# Patient Record
(demographics unavailable — no encounter records)

---

## 2024-12-05 NOTE — IMAGING
[Facet arthropathy] : Facet arthropathy [Disc space narrowing] : Disc space narrowing [de-identified] : LSPINE skin intact  Palpation: No tenderness to palpation or spasm in bilateral thoracic and lumbar paraspinal musculature, no SI joint tenderness to palpation ROM: limited all planes Strength: 5/5 bilateral hip flexors, knee extensors, ankle dorsiflexors, EHL, ankle plantarflexors Sensation: Sensation present to light touch bilateral L2-S1 distributions Provocative maneuvers: Negative R straight leg raise; + L SLR  Bilateral hips- Palpation: No tenderness to palpation over greater trochanter or IT band ROM: No pain with flexion and internal rotation [FreeTextEntry1] : Crest at L4/5 with alicia-sacralized Right L5 and somewhat diminutive L5/S1 disc

## 2024-12-05 NOTE — HISTORY OF PRESENT ILLNESS
[10] : 10 [Throbbing] : throbbing [Constant] : constant [Household chores] : household chores [Sleep] : sleep [Nothing helps with pain getting better] : Nothing helps with pain getting better [Sitting] : sitting [Walking] : walking [Bending forward] : bending forward [Stairs] : stairs [de-identified] : 24 [facility] Lumbar MRI  - report noted in chart.  Sacralization of the L5 vertebral body.  Vertebral body height, marrow signal homogeneity, and facet alignment are maintained throughout the visualized spinal segments. Mild exaggeration of the lumbar lordosis. Mild disc space narrowing at L5-S1. Conus terminates at L1 and is normal in size and morphology. No abnormal intrinsic cord signal. T11-T12: No spinal canal stenosis or neural foraminal narrowing.  T12-L1: No spinal canal stenosis or neural foraminal narrowing.  L1-L2: No spinal canal stenosis or neural foraminal narrowing..  L2-L3: Marked bilateral facet hypertrophy results in mild thecal sac compression. No neural foraminal narrowing.  L3-L4: Marked bilateral facet hypertrophy results in mild to moderate thecal sac compression and exerts mass effect upon the descending left L4 nerve root in the left lateral recess. Mild right and mild to moderate left neural foraminal narrowing.  L4-L5: Disc bulge and marked bilateral facet hypertrophy results in moderate to severe thecal sac compression. Mild bilateral neural foraminal narrowing.  L5-S1: Bilateral facet hypertrophy. No spinal canal stenosis. Mild left neural foraminal narrowing.  Ind. review- Agree Left lateral recess stenosis L3/4,  Severe central stenosis and sub-articular stenosis L4/5  ======================================================================== PMH: HTN, HLD, DM2 6.7  PSH: stent x2, L TKA, CCY, , pituitary adenoma SH: no tobacco, rare etoh Occ: former home care  2024: 59 year-old female patient presents for lower back pain. pt states that she has been dealing with lower back pain for  over a decade but the pain intermittent. she states that for the last 6 weeks she been dealing with extreme pain that has sent her to the ER. Radiates to the L glutes. Tries OTCs, no relief. Has tried gabapentin in the past. MDPs in the past without relief. Has been through formal PT.  2024: patient returns for follow up on lower back. Persisting back pain into L buttocks. Would like to hold off sx and discuss other conservative measures.  10/03/2024: Patient is here to follow up on lower back pain, reports small improvement with lidocaine patch as well as diclofenac gel.  Has not done physical therapy due to not working in past.   2024: patient is here to follow up on lower back pain. she reports worsening symptoms x 1 week. notes that she has been using lido patches and diclofenac gel with some relief. Today 9/10 pain level.  failed conservative measures of Nsiads/pt/muscle relaxers.  [] : no [FreeTextEntry1] : lower back

## 2024-12-05 NOTE — ASSESSMENT
[FreeTextEntry1] : Agree Left lateral recess stenosis L3/4,  Severe central stenosis and sub-articular stenosis L4/5  XR shows alicia-sacralized Right L5, stable segment   Will send for updated MRI for surgical planning, prior MRI axial imaging is not appropriately angled with respect to sagittals  Has failed extensive conservative measures including PT, medications, not interested in LESI, is interested in more definitive interventions. Surgery discussed with patient once again.  Indicating for laminectomy and fusion L3-L5 with TLIF L4/5 Laminectomy and Fusion- We've discussed the surgery details including instrumentation and grafting options (local, allograft, ICBG, and biologics) as well as potential for complications including but not limited to pain, scar, bleeding, and infection. There is also a possibility for hardware complication such as malposition of hardware, hardware loosening, pullout, failure or fracture of bone, adjacent segment disease, pseudarthrosis, and need for future surgery. Finally, we discussed potential for injury to nerves, the spinal cord either transient or permanent, damage to blood vessels, CSF leak, blindness, need for transfusion, and medical complications. The patient verbalized understanding and all questions were answered.  Patient has decided to move forward with surgery at this time.  Discussed resume PT, does not want to continue due to poor outcome, role of injection therapy (does not want it now), and medication management. Patient would like to continue topical agents   NSAIDs- Patient warned of risk of medication to GI tract, increased blood pressure, cardiac risk, and risk of fluid retention.  Advised to clear medication with internist or PCP if any concurrent health problem with heart, blood pressure, or GI system exists, regardless of gel use.

## 2025-01-16 NOTE — HISTORY OF PRESENT ILLNESS
[10] : 10 [Throbbing] : throbbing [Constant] : constant [Household chores] : household chores [Sleep] : sleep [Nothing helps with pain getting better] : Nothing helps with pain getting better [Sitting] : sitting [Walking] : walking [Bending forward] : bending forward [Stairs] : stairs [de-identified] : 24 [facility] Lumbar MRI  - report noted in chart.  Sacralization of the L5 vertebral body.  Vertebral body height, marrow signal homogeneity, and facet alignment are maintained throughout the visualized spinal segments. Mild exaggeration of the lumbar lordosis. Mild disc space narrowing at L5-S1. Conus terminates at L1 and is normal in size and morphology. No abnormal intrinsic cord signal. T11-T12: No spinal canal stenosis or neural foraminal narrowing. T12-L1: No spinal canal stenosis or neural foraminal narrowing. L1-L2: No spinal canal stenosis or neural foraminal narrowing.. L2-L3: Marked bilateral facet hypertrophy results in mild thecal sac compression. No neural foraminal narrowing. L3-L4: Marked bilateral facet hypertrophy results in mild to moderate thecal sac compression and exerts mass effect upon the descending left L4 nerve root in the left lateral recess. Mild right and mild to moderate left neural foraminal narrowing. L4-L5: Disc bulge and marked bilateral facet hypertrophy results in moderate to severe thecal sac compression. Mild bilateral neural foraminal narrowing. L5-S1: Bilateral facet hypertrophy. No spinal canal stenosis. Mild left neural foraminal narrowing. Ind. review- Agree Left lateral recess stenosis L3/4,  Severe central stenosis and sub-articular stenosis L4/5   2024 Lumbar MRI  - report noted in chart.   Ind. 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 pt states that she has been dealing with lower back pain for  over a decade but the pain intermittent. she states that for the last 6 weeks she been dealing with extreme pain that has sent her to the ER. Radiates to the L glutes. Tries OTCs, no relief. Has tried gabapentin in the past. MDPs in the past without relief. Has been through formal PT in ~May-2024.  2024: patient returns for follow up on lower back. Persisting back pain into L buttocks. Would like to hold off sx and discuss other conservative measures.  10/03/2024: Patient is here to follow up on lower back pain, reports small improvement with lidocaine patch as well as diclofenac gel.  Has not done physical therapy due to not working in past.   2024: patient is here to follow up on lower back pain. she reports worsening symptoms x 1 week. notes that she has been using lido patches and diclofenac gel with some relief. Today 9/10 pain level.  failed conservative measures of Nsiads/pt/muscle relaxers.   2025: patient is here to follow up on lower back pain. patient states she is in severe pain. difficult to sit or lay for long periods of time. Awaiting insurance approval for surgery-which was recently cancelled. Has been doing HEP w/o relief.    [] : no [FreeTextEntry1] : lower back

## 2025-01-16 NOTE — IMAGING
[Facet arthropathy] : Facet arthropathy [Disc space narrowing] : Disc space narrowing [de-identified] : LSPINE skin intact  Palpation: No tenderness to palpation or spasm in bilateral thoracic and lumbar paraspinal musculature, no SI joint tenderness to palpation ROM: limited all planes Strength: 5/5 bilateral hip flexors, knee extensors, ankle dorsiflexors, EHL, ankle plantarflexors Sensation: Sensation present to light touch bilateral L2-S1 distributions Provocative maneuvers: Negative R straight leg raise; + L SLR  Bilateral hips- Palpation: No tenderness to palpation over greater trochanter or IT band ROM: No pain with flexion and internal rotation  BMI: 28.3  Ht: 5'3 Wt: 160 [FreeTextEntry1] : Crest at L4/5 with alicia-sacralized Right L5 and somewhat diminutive L5/S1 disc

## 2025-01-16 NOTE — ASSESSMENT
[FreeTextEntry1] : Agree Left NF stenosis L3/4,  Severe central stenosis and sub-articular stenosis L4/5  XR shows alicia-sacralized Right L5, stable segment   Has failed extensive conservative measures including PT, continued HEP under my care for the last 7 months, medications, not interested in LESI, is interested in more definitive interventions. Surgery discussed with patient once again.  Indicating for laminectomy and fusion L3-L5 with TLIF L4/5. Will require >50% facet resection to adequately decompress at L3/4 and L4/5 TLIF for spondylolisthesis.  Laminectomy and Fusion- We've discussed the surgery details including instrumentation and grafting options (local, allograft, ICBG, and biologics) as well as potential for complications including but not limited to pain, scar, bleeding, and infection. There is also a possibility for hardware complication such as malposition of hardware, hardware loosening, pullout, failure or fracture of bone, adjacent segment disease, pseudarthrosis, and need for future surgery. Finally, we discussed potential for injury to nerves, the spinal cord either transient or permanent, damage to blood vessels, CSF leak, blindness, need for transfusion, and medical complications. The patient verbalized understanding and all questions were answered.  NSAIDs- Patient warned of risk of medication to GI tract, increased blood pressure, cardiac risk, and risk of fluid retention.  Advised to clear medication with internist or PCP if any concurrent health problem with heart, blood pressure, or GI system exists, regardless of gel use.

## 2025-02-27 NOTE — PHYSICAL EXAM
[de-identified] : LSPINE Inspection: incision clean, no signs of infection.  Palpation: spasm in bilateral thoracic and lumbar paraspinal musculature, no SI joint tenderness to palpation ROM: Limited in al planes Strength: 5/5 bilateral hip flexors, knee extensors, ankle dorsiflexors, EHL, ankle plantarflexors Sensation: Sensation present to light touch bilateral L2-S1 distributions Provocative maneuvers: Negative bilateral straight leg raise

## 2025-02-27 NOTE — HISTORY OF PRESENT ILLNESS
[10] : 10 [Throbbing] : throbbing [Constant] : constant [Household chores] : household chores [Sleep] : sleep [Nothing helps with pain getting better] : Nothing helps with pain getting better [Sitting] : sitting [Walking] : walking [Bending forward] : bending forward [Stairs] : stairs [de-identified] : 2/10/25 SURGERY L4/5 TLIF L sided, 3-5 LAMI, dural repair  24 [facility] Lumbar MRI  - report noted in chart.  Sacralization of the L5 vertebral body.  Vertebral body height, marrow signal homogeneity, and facet alignment are maintained throughout the visualized spinal segments. Mild exaggeration of the lumbar lordosis. Mild disc space narrowing at L5-S1. Conus terminates at L1 and is normal in size and morphology. No abnormal intrinsic cord signal. T11-T12: No spinal canal stenosis or neural foraminal narrowing. T12-L1: No spinal canal stenosis or neural foraminal narrowing. L1-L2: No spinal canal stenosis or neural foraminal narrowing.. L2-L3: Marked bilateral facet hypertrophy results in mild thecal sac compression. No neural foraminal narrowing. L3-L4: Marked bilateral facet hypertrophy results in mild to moderate thecal sac compression and exerts mass effect upon the descending left L4 nerve root in the left lateral recess. Mild right and mild to moderate left neural foraminal narrowing. L4-L5: Disc bulge and marked bilateral facet hypertrophy results in moderate to severe thecal sac compression. Mild bilateral neural foraminal narrowing. L5-S1: Bilateral facet hypertrophy. No spinal canal stenosis. Mild left neural foraminal narrowing. Ind. review- Agree Left lateral recess stenosis L3/4,  Severe central stenosis and sub-articular stenosis L4/5   2024 Lumbar MRI  - report noted in chart.   Ind. 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 pt states that she has been dealing with lower back pain for  over a decade but the pain intermittent. she states that for the last 6 weeks she been dealing with extreme pain that has sent her to the ER. Radiates to the L glutes. Tries OTCs, no relief. Has tried gabapentin in the past. MDPs in the past without relief. Has been through formal PT in ~May-2024.  2024: patient returns for follow up on lower back. Persisting back pain into L buttocks. Would like to hold off sx and discuss other conservative measures.  10/03/2024: Patient is here to follow up on lower back pain, reports small improvement with lidocaine patch as well as diclofenac gel.  Has not done physical therapy due to not working in past.   2024: patient is here to follow up on lower back pain. she reports worsening symptoms x 1 week. notes that she has been using lido patches and diclofenac gel with some relief. Today 9/10 pain level.  failed conservative measures of Nsiads/pt/muscle relaxers.   2025: patient is here to follow up on lower back pain. patient states she is in severe pain. difficult to sit or lay for long periods of time. Awaiting insurance approval for surgery-which was recently cancelled. Has been doing HEP w/o relief.   2025: patient is here for pov#1. Sore back. Pain down the LLE improved, still some N/T.    [] : no [FreeTextEntry1] : lower back

## 2025-02-27 NOTE — PHYSICAL EXAM
[de-identified] : LSPINE Inspection: incision clean, no signs of infection.  Palpation: spasm in bilateral thoracic and lumbar paraspinal musculature, no SI joint tenderness to palpation ROM: Limited in al planes Strength: 5/5 bilateral hip flexors, knee extensors, ankle dorsiflexors, EHL, ankle plantarflexors Sensation: Sensation present to light touch bilateral L2-S1 distributions Provocative maneuvers: Negative bilateral straight leg raise

## 2025-02-27 NOTE — ASSESSMENT
[FreeTextEntry1] : 2/10/25 SURGERY L4/5 TLIF L sided, 3-5 LAMI, dural repair  Discussed wound care  F/up in 1 month, will discuss PT.

## 2025-02-27 NOTE — HISTORY OF PRESENT ILLNESS
[10] : 10 [Throbbing] : throbbing [Constant] : constant [Household chores] : household chores [Sleep] : sleep [Nothing helps with pain getting better] : Nothing helps with pain getting better [Sitting] : sitting [Walking] : walking [Bending forward] : bending forward [Stairs] : stairs [de-identified] : 2/10/25 SURGERY L4/5 TLIF L sided, 3-5 LAMI, dural repair  24 [facility] Lumbar MRI  - report noted in chart.  Sacralization of the L5 vertebral body.  Vertebral body height, marrow signal homogeneity, and facet alignment are maintained throughout the visualized spinal segments. Mild exaggeration of the lumbar lordosis. Mild disc space narrowing at L5-S1. Conus terminates at L1 and is normal in size and morphology. No abnormal intrinsic cord signal. T11-T12: No spinal canal stenosis or neural foraminal narrowing. T12-L1: No spinal canal stenosis or neural foraminal narrowing. L1-L2: No spinal canal stenosis or neural foraminal narrowing.. L2-L3: Marked bilateral facet hypertrophy results in mild thecal sac compression. No neural foraminal narrowing. L3-L4: Marked bilateral facet hypertrophy results in mild to moderate thecal sac compression and exerts mass effect upon the descending left L4 nerve root in the left lateral recess. Mild right and mild to moderate left neural foraminal narrowing. L4-L5: Disc bulge and marked bilateral facet hypertrophy results in moderate to severe thecal sac compression. Mild bilateral neural foraminal narrowing. L5-S1: Bilateral facet hypertrophy. No spinal canal stenosis. Mild left neural foraminal narrowing. Ind. review- Agree Left lateral recess stenosis L3/4,  Severe central stenosis and sub-articular stenosis L4/5   2024 Lumbar MRI  - report noted in chart.   Ind. 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 pt states that she has been dealing with lower back pain for  over a decade but the pain intermittent. she states that for the last 6 weeks she been dealing with extreme pain that has sent her to the ER. Radiates to the L glutes. Tries OTCs, no relief. Has tried gabapentin in the past. MDPs in the past without relief. Has been through formal PT in ~May-2024.  2024: patient returns for follow up on lower back. Persisting back pain into L buttocks. Would like to hold off sx and discuss other conservative measures.  10/03/2024: Patient is here to follow up on lower back pain, reports small improvement with lidocaine patch as well as diclofenac gel.  Has not done physical therapy due to not working in past.   2024: patient is here to follow up on lower back pain. she reports worsening symptoms x 1 week. notes that she has been using lido patches and diclofenac gel with some relief. Today 9/10 pain level.  failed conservative measures of Nsiads/pt/muscle relaxers.   2025: patient is here to follow up on lower back pain. patient states she is in severe pain. difficult to sit or lay for long periods of time. Awaiting insurance approval for surgery-which was recently cancelled. Has been doing HEP w/o relief.   2025: patient is here for pov#1. Sore back. Pain down the LLE improved, still some N/T.    [] : no [FreeTextEntry1] : lower back

## 2025-03-03 NOTE — IMAGING
[Facet arthropathy] : Facet arthropathy [de-identified] : LSPINE skin intact no e/o infection  Palpation: No tenderness to palpation or spasm in bilateral thoracic and lumbar paraspinal musculature, no SI joint tenderness to palpation ROM: limited all planes Strength: 5/5 bilateral hip flexors, knee extensors, ankle dorsiflexors, EHL, ankle plantarflexors Sensation: Sensation present to light touch bilateral L2-S1 distributions Provocative maneuvers: Negative SLR   Bilateral hips- Palpation: No tenderness to palpation over greater trochanter or IT band ROM: No pain with flexion and internal rotation   [Disc space narrowing] : Disc space narrowing [Implants in position] : Implants in position [FreeTextEntry1] : Crest at L4/5 with alicia-sacralized Right L5 and somewhat diminutive L5/S1 disc

## 2025-03-03 NOTE — IMAGING
[Facet arthropathy] : Facet arthropathy [de-identified] : LSPINE skin intact no e/o infection  Palpation: No tenderness to palpation or spasm in bilateral thoracic and lumbar paraspinal musculature, no SI joint tenderness to palpation ROM: limited all planes Strength: 5/5 bilateral hip flexors, knee extensors, ankle dorsiflexors, EHL, ankle plantarflexors Sensation: Sensation present to light touch bilateral L2-S1 distributions Provocative maneuvers: Negative SLR   Bilateral hips- Palpation: No tenderness to palpation over greater trochanter or IT band ROM: No pain with flexion and internal rotation   [Disc space narrowing] : Disc space narrowing [Implants in position] : Implants in position [FreeTextEntry1] : Crest at L4/5 with alicia-sacralized Right L5 and somewhat diminutive L5/S1 disc

## 2025-03-27 NOTE — HISTORY OF PRESENT ILLNESS
[10] : 10 [Throbbing] : throbbing [Constant] : constant [Household chores] : household chores [Sleep] : sleep [Nothing helps with pain getting better] : Nothing helps with pain getting better [Sitting] : sitting [Walking] : walking [Bending forward] : bending forward [Stairs] : stairs [de-identified] : 2/10/25 SURGERY L4/5 TLIF L sided, 3-5 LAMI, dural repair  24 [facility] Lumbar MRI  - report noted in chart.  Sacralization of the L5 vertebral body.  Vertebral body height, marrow signal homogeneity, and facet alignment are maintained throughout the visualized spinal segments. Mild exaggeration of the lumbar lordosis. Mild disc space narrowing at L5-S1. Conus terminates at L1 and is normal in size and morphology. No abnormal intrinsic cord signal. T11-T12: No spinal canal stenosis or neural foraminal narrowing. T12-L1: No spinal canal stenosis or neural foraminal narrowing. L1-L2: No spinal canal stenosis or neural foraminal narrowing.. L2-L3: Marked bilateral facet hypertrophy results in mild thecal sac compression. No neural foraminal narrowing. L3-L4: Marked bilateral facet hypertrophy results in mild to moderate thecal sac compression and exerts mass effect upon the descending left L4 nerve root in the left lateral recess. Mild right and mild to moderate left neural foraminal narrowing. L4-L5: Disc bulge and marked bilateral facet hypertrophy results in moderate to severe thecal sac compression. Mild bilateral neural foraminal narrowing. L5-S1: Bilateral facet hypertrophy. No spinal canal stenosis. Mild left neural foraminal narrowing. Ind. review- Agree Left lateral recess stenosis L3/4,  Severe central stenosis and sub-articular stenosis L4/5   2024 Lumbar MRI  - report noted in chart.   Ind. 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 pt states that she has been dealing with lower back pain for  over a decade but the pain intermittent. she states that for the last 6 weeks she been dealing with extreme pain that has sent her to the ER. Radiates to the L glutes. Tries OTCs, no relief. Has tried gabapentin in the past. MDPs in the past without relief. Has been through formal PT in ~May-2024.  2024: patient returns for follow up on lower back. Persisting back pain into L buttocks. Would like to hold off sx and discuss other conservative measures.  10/03/2024: Patient is here to follow up on lower back pain, reports small improvement with lidocaine patch as well as diclofenac gel.  Has not done physical therapy due to not working in past.   2024: patient is here to follow up on lower back pain. she reports worsening symptoms x 1 week. notes that she has been using lido patches and diclofenac gel with some relief. Today 9/10 pain level.  failed conservative measures of Nsiads/pt/muscle relaxers.   2025: patient is here to follow up on lower back pain. patient states she is in severe pain. difficult to sit or lay for long periods of time. Awaiting insurance approval for surgery-which was recently cancelled. Has been doing HEP w/o relief.   2025: patient is here for pov#1. Sore back. Pain down the LLE improved, still some N/T.   2025: patient is here for pov#2.  she notes still having soreness, n/t is no longer present.    [] : no [FreeTextEntry1] : lower back

## 2025-03-27 NOTE — IMAGING
[Disc space narrowing] : Disc space narrowing [Implants in position] : Implants in position [de-identified] : LSPINE skin intact no e/o infection  Palpation: No tenderness to palpation or spasm in bilateral thoracic and lumbar paraspinal musculature, no SI joint tenderness to palpation ROM: limited all planes Strength: 5/5 bilateral hip flexors, knee extensors, ankle dorsiflexors, EHL, ankle plantarflexors Sensation: Sensation present to light touch bilateral L2-S1 distributions Provocative maneuvers: Negative SLR   Bilateral hips- Palpation: No tenderness to palpation over greater trochanter or IT band ROM: No pain with flexion and internal rotation

## 2025-03-27 NOTE — ASSESSMENT
[FreeTextEntry1] : 2/10/25 SURGERY L4/5 TLIF L sided, 3-5 LAMI, dural repair  Start PT  F/up in 6-8 weeks

## 2025-05-08 NOTE — IMAGING
[Disc space narrowing] : Disc space narrowing [Implants in position] : Implants in position [de-identified] : LSPINE skin intact no e/o infection  Palpation: No tenderness to palpation or spasm in bilateral thoracic and lumbar paraspinal musculature, no SI joint tenderness to palpation ROM: limited all planes Strength: 5/5 bilateral hip flexors, knee extensors, ankle dorsiflexors, EHL, ankle plantarflexors Sensation: Sensation present to light touch bilateral L2-S1 distributions Provocative maneuvers: Negative SLR   Bilateral hips- Palpation: No tenderness to palpation over greater trochanter or IT band ROM: No pain with flexion and internal rotation

## 2025-05-08 NOTE — HISTORY OF PRESENT ILLNESS
[10] : 10 [Throbbing] : throbbing [Constant] : constant [Household chores] : household chores [Sleep] : sleep [Nothing helps with pain getting better] : Nothing helps with pain getting better [Sitting] : sitting [Walking] : walking [Bending forward] : bending forward [Stairs] : stairs [de-identified] : 2/10/25 SURGERY L4/5 TLIF L sided, 3-5 LAMI, dural repair  24 [facility] Lumbar MRI  - report noted in chart.  Sacralization of the L5 vertebral body.  Vertebral body height, marrow signal homogeneity, and facet alignment are maintained throughout the visualized spinal segments. Mild exaggeration of the lumbar lordosis. Mild disc space narrowing at L5-S1. Conus terminates at L1 and is normal in size and morphology. No abnormal intrinsic cord signal. T11-T12: No spinal canal stenosis or neural foraminal narrowing. T12-L1: No spinal canal stenosis or neural foraminal narrowing. L1-L2: No spinal canal stenosis or neural foraminal narrowing.. L2-L3: Marked bilateral facet hypertrophy results in mild thecal sac compression. No neural foraminal narrowing. L3-L4: Marked bilateral facet hypertrophy results in mild to moderate thecal sac compression and exerts mass effect upon the descending left L4 nerve root in the left lateral recess. Mild right and mild to moderate left neural foraminal narrowing. L4-L5: Disc bulge and marked bilateral facet hypertrophy results in moderate to severe thecal sac compression. Mild bilateral neural foraminal narrowing. L5-S1: Bilateral facet hypertrophy. No spinal canal stenosis. Mild left neural foraminal narrowing. Ind. review- Agree Left lateral recess stenosis L3/4,  Severe central stenosis and sub-articular stenosis L4/5   2024 Lumbar MRI  - report noted in chart.   Ind. 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 pt states that she has been dealing with lower back pain for  over a decade but the pain intermittent. she states that for the last 6 weeks she been dealing with extreme pain that has sent her to the ER. Radiates to the L glutes. Tries OTCs, no relief. Has tried gabapentin in the past. MDPs in the past without relief. Has been through formal PT in ~May-2024.  2024: patient returns for follow up on lower back. Persisting back pain into L buttocks. Would like to hold off sx and discuss other conservative measures.  10/03/2024: Patient is here to follow up on lower back pain, reports small improvement with lidocaine patch as well as diclofenac gel.  Has not done physical therapy due to not working in past.   2024: patient is here to follow up on lower back pain. she reports worsening symptoms x 1 week. notes that she has been using lido patches and diclofenac gel with some relief. Today 9/10 pain level.  failed conservative measures of Nsiads/pt/muscle relaxers.   2025: patient is here to follow up on lower back pain. patient states she is in severe pain. difficult to sit or lay for long periods of time. Awaiting insurance approval for surgery-which was recently cancelled. Has been doing HEP w/o relief.   2025: patient is here for pov#1. Sore back. Pain down the LLE improved, still some N/T.   2025: patient is here for pov#2.  she notes still having soreness, n/t is no longer present.   2025; patient is here for pov#3. notes going to physical therapy in which now her L foot is bothering her (PT manipulated the LLE by the L foot), has pain when sitting up for prolonged periods.    [] : no [FreeTextEntry1] : lower back

## 2025-07-03 NOTE — HISTORY OF PRESENT ILLNESS
[10] : 10 [Throbbing] : throbbing [Constant] : constant [Household chores] : household chores [Sleep] : sleep [Nothing helps with pain getting better] : Nothing helps with pain getting better [Sitting] : sitting [Walking] : walking [Bending forward] : bending forward [Stairs] : stairs [de-identified] : 2/10/25 SURGERY L4/5 TLIF L sided, 3-5 LAMI, dural repair  24 [facility] Lumbar MRI  - report noted in chart.  Sacralization of the L5 vertebral body.  Vertebral body height, marrow signal homogeneity, and facet alignment are maintained throughout the visualized spinal segments. Mild exaggeration of the lumbar lordosis. Mild disc space narrowing at L5-S1. Conus terminates at L1 and is normal in size and morphology. No abnormal intrinsic cord signal. T11-T12: No spinal canal stenosis or neural foraminal narrowing. T12-L1: No spinal canal stenosis or neural foraminal narrowing. L1-L2: No spinal canal stenosis or neural foraminal narrowing.. L2-L3: Marked bilateral facet hypertrophy results in mild thecal sac compression. No neural foraminal narrowing. L3-L4: Marked bilateral facet hypertrophy results in mild to moderate thecal sac compression and exerts mass effect upon the descending left L4 nerve root in the left lateral recess. Mild right and mild to moderate left neural foraminal narrowing. L4-L5: Disc bulge and marked bilateral facet hypertrophy results in moderate to severe thecal sac compression. Mild bilateral neural foraminal narrowing. L5-S1: Bilateral facet hypertrophy. No spinal canal stenosis. Mild left neural foraminal narrowing. Ind. review- Agree Left lateral recess stenosis L3/4,  Severe central stenosis and sub-articular stenosis L4/5   2024 Lumbar MRI  - report noted in chart.   Ind. 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 pt states that she has been dealing with lower back pain for  over a decade but the pain intermittent. she states that for the last 6 weeks she been dealing with extreme pain that has sent her to the ER. Radiates to the L glutes. Tries OTCs, no relief. Has tried gabapentin in the past. MDPs in the past without relief. Has been through formal PT in ~May-2024.  2024: patient returns for follow up on lower back. Persisting back pain into L buttocks. Would like to hold off sx and discuss other conservative measures.  10/03/2024: Patient is here to follow up on lower back pain, reports small improvement with lidocaine patch as well as diclofenac gel.  Has not done physical therapy due to not working in past.   2024: patient is here to follow up on lower back pain. she reports worsening symptoms x 1 week. notes that she has been using lido patches and diclofenac gel with some relief. Today 9/10 pain level.  failed conservative measures of Nsiads/pt/muscle relaxers.   2025: patient is here to follow up on lower back pain. patient states she is in severe pain. difficult to sit or lay for long periods of time. Awaiting insurance approval for surgery-which was recently cancelled. Has been doing HEP w/o relief.   2025: patient is here for pov#1. Sore back. Pain down the LLE improved, still some N/T.   2025: patient is here for pov#2.  she notes still having soreness, n/t is no longer present.   2025; patient is here for pov#3. notes going to physical therapy in which now her L foot is bothering her (PT manipulated the LLE by the L foot), has pain when sitting up for prolonged periods.   2025 patient is here to follow up on lumbar spine. she notes feeling better ambulating with cane. no longer doing physical therapy. No pain down the legs.     [] : no [FreeTextEntry1] : lower back

## 2025-07-03 NOTE — ASSESSMENT
[FreeTextEntry1] : 2/10/25 SURGERY L4/5 TLIF L sided, 3-5 LAMI, dural repair Appears to be some early consolidation in gutters C/w PT F/U 2 months  Muscle Relaxants- To help decrease muscle spasm and assist with pain relief. Advised of sedating effects and instructed not to drive, operate heavy machinery, or take with other sedating medications.

## 2025-07-03 NOTE — IMAGING
[Disc space narrowing] : Disc space narrowing [Implants in position] : Implants in position [de-identified] : LSPINE Palpation: No tenderness to palpation or spasm in bilateral thoracic and lumbar paraspinal musculature, no SI joint tenderness to palpation ROM: limited all planes Strength: 5/5 bilateral hip flexors, knee extensors, ankle dorsiflexors, EHL, ankle plantarflexors Sensation: Sensation present to light touch bilateral L2-S1 distributions Provocative maneuvers: Negative SLR   Bilateral hips- Palpation: No tenderness to palpation over greater trochanter or IT band ROM: No pain with flexion and internal rotation